# Patient Record
Sex: MALE | URBAN - METROPOLITAN AREA
[De-identification: names, ages, dates, MRNs, and addresses within clinical notes are randomized per-mention and may not be internally consistent; named-entity substitution may affect disease eponyms.]

---

## 2023-07-17 ENCOUNTER — HOSPITAL ENCOUNTER (EMERGENCY)
Facility: CLINIC | Age: 38
Discharge: HOME | End: 2023-07-17
Attending: EMERGENCY MEDICINE
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
## 2023-07-17 NOTE — ED TRIAGE NOTES
Triage Assessment     Row Name 07/17/23 0436       Triage Assessment (Adult)    Airway WDL --       Respiratory WDL    Respiratory WDL --       Skin Circulation/Temperature WDL    Skin Circulation/Temperature WDL --       Cardiac WDL    Cardiac WDL --       Peripheral/Neurovascular WDL    Peripheral Neurovascular WDL --       Cognitive/Neuro/Behavioral WDL    Cognitive/Neuro/Behavioral WDL --